# Patient Record
(demographics unavailable — no encounter records)

---

## 2024-10-18 NOTE — ASSESSMENT
[FreeTextEntry1] : here today for  evaluation of osteoporosis   Osteoporosis:  Diagnosed  by a bone density scan in 2021, had osteopenia before that. Lumbar spine T score was -2.9 in 2021, then deteriorated to -3.1 in 7/23.  . Osteoporosis  1- Discussed medication options/ mechanisms of action. Risks/ benefits/ side effects.  Discussed risk of ONJ with bisphosphonates and prolia  in detail. Patient does not want to start any treatment yet, she wants to wait another year and repeat DXA next year then decide. 2- Labs to rule out secondary causes done in 9/24, mostly everything normal, discussed labs in detail with patient.   3. Lifestyle changes: Continue Calcium and vitamin D supplementation. Smoking cessation Alcohol max  use 1 drink day women/ 2 drinks day men Daily weight bearing and postural exercises Dietary calcium  h/o thyroid nodule: We reviewed US  from 2017 which she brought this visit , showed 1.6 cm right sided cyst and some small sub centimeter nodules. Then had US recently in 9/24 . Reviewed in detail with patient. Shows 2 cm cyst and some sub centimeter nodules. I have discussed with the patient the incidence of thyroid nodules (fairly high) and the incidence of thyroid cancer (fairly low). We also discussed what can be the worrisome features of malignant nodule and increased incidence of thyroid cancer in patients >60 years old.  We discussed with patient that per [facility]  guidelines  FNA is not recommended in sub centimeter nodules in low risk patient like herself risk of malignancy is very low. Discussed with patient cysts are generally benign and it has grown slightly since 2017, so not concerning, also other nodules are small so will repeat US in 1 yr.  Labs: calcium Phos PTH Mag Vitamin D  Renal functions TSH DXA  Neck US.  Follow up: 1 yr

## 2024-10-18 NOTE — HISTORY OF PRESENT ILLNESS
[FreeTextEntry1] : here today for  follow up of osteoporosis   Osteoporosis:  Diagnosed  by a bone density scan in 2021, had osteopenia before that. Lumbar spine T score was -2.9 in 2021, then deteriorated to -3.1 in 7/23.  Patient denies history of fracture.  She is currently being treated with vitamin D supplementation.  Estimated dietary calcium is mod. Patient  does not  reports height loss of > 2 inches. Patient has not  fallen > 2 times in the past year.  Patient  does not participate in routine weight bearing exercises  Past antiresorptive treatment: none  Osteoporosis Risk Factors  Nonmodifiable Personal Hx of fracture as an adult: no Hx of fracture in first-degree relative:no  race: yes Advanced age: yes Female sex: yes Dementia: no Poor health/frailty: no Chronic Glucocorticoid Use :/no Primary hyperparathyroidism  :no Chronic Seizure Medications  :no Breast Cancer with aromatase inhibitor use : no History of Kidney stone : no:  Potentially modifiable: Tobacco use: no: Low body weight (<127 lbs):no Estrogen deficiency    early menopause (age <45) or bilateral ovariectomy: no    prolonged premenopausal amenorrhea(>1 yr): no    DEPOProvera use  :no Low calcium intake (lifelong): no Alcoholism: no Recurrent falls: no Inadequate physical activity:  no Vitamin D Deficiency :no  Current calcium and Vit D intake: Dietary sources: yogurt daily, some  milk daily Supplements: vitamin D , 4000 units daily.  No broken bones, no kidney stones in interim.   She has h/o thyroid nodules. she had some scans done few years ago in 2017, showed probable thyroid nodules, she did not think she ever had thyroid US, wants it to get checked as well. We reviewed US  from 2017 which she brought this visit , showed 1.6 cm right sided cyst and some small sun centimeter nodules. Then had US recently in 9/24 .

## 2024-10-18 NOTE — PHYSICAL EXAM
[Alert] : alert [Well Nourished] : well nourished [No Acute Distress] : no acute distress [Normal Sclera/Conjunctiva] : normal sclera/conjunctiva [No Neck Mass] : no neck mass was observed [No LAD] : no lymphadenopathy [Thyroid Not Enlarged] : the thyroid was not enlarged [No Respiratory Distress] : no respiratory distress [No Accessory Muscle Use] : no accessory muscle use [Clear to Auscultation] : lungs were clear to auscultation bilaterally [Normal S1, S2] : normal S1 and S2 [Normal Rate] : heart rate was normal [Regular Rhythm] : with a regular rhythm [No Stigmata of Cushings Syndrome] : no stigmata of Cushings Syndrome [Normal Gait] : normal gait [No Tremors] : no tremors [Oriented x3] : oriented to person, place, and time

## 2024-10-18 NOTE — REVIEW OF SYSTEMS
[Fatigue] : no fatigue [Decreased Appetite] : appetite not decreased [Recent Weight Gain (___ Lbs)] : no recent weight gain [Recent Weight Loss (___ Lbs)] : no recent weight loss [Visual Field Defect] : no visual field defect [Dysphagia] : no dysphagia [Chest Pain] : no chest pain [Palpitations] : no palpitations [Lower Ext Edema] : no lower extremity edema [Shortness Of Breath] : no shortness of breath [Cough] : no cough [Nausea] : no nausea [Constipation] : no constipation [Vomiting] : no vomiting [Diarrhea] : no diarrhea [Polyuria] : no polyuria [Joint Pain] : no joint pain [Muscle Weakness] : no muscle weakness [Myalgia] : no myalgia  [Acanthosis] : no acanthosis  [Dry Skin] : no dry skin [Headaches] : no headaches [Dizziness] : no dizziness [Tremors] : no tremors [Depression] : no depression [Insomnia] : no insomnia [Cold Intolerance] : no cold intolerance [Heat Intolerance] : no heat intolerance